# Patient Record
Sex: FEMALE | Race: WHITE | NOT HISPANIC OR LATINO | Employment: FULL TIME | URBAN - NONMETROPOLITAN AREA
[De-identification: names, ages, dates, MRNs, and addresses within clinical notes are randomized per-mention and may not be internally consistent; named-entity substitution may affect disease eponyms.]

---

## 2021-10-26 ENCOUNTER — OFFICE VISIT (OUTPATIENT)
Dept: CARDIOLOGY | Facility: CLINIC | Age: 46
End: 2021-10-26

## 2021-10-26 VITALS
HEART RATE: 58 BPM | TEMPERATURE: 97.5 F | DIASTOLIC BLOOD PRESSURE: 70 MMHG | WEIGHT: 220.8 LBS | HEIGHT: 70 IN | SYSTOLIC BLOOD PRESSURE: 118 MMHG | BODY MASS INDEX: 31.61 KG/M2

## 2021-10-26 DIAGNOSIS — R00.1 BRADYCARDIA, SINUS: ICD-10-CM

## 2021-10-26 DIAGNOSIS — R01.1 MURMUR, CARDIAC: ICD-10-CM

## 2021-10-26 DIAGNOSIS — R00.2 PALPITATIONS: Primary | ICD-10-CM

## 2021-10-26 DIAGNOSIS — E88.81 METABOLIC SYNDROME: ICD-10-CM

## 2021-10-26 PROBLEM — E88.810 METABOLIC SYNDROME: Status: ACTIVE | Noted: 2021-10-26

## 2021-10-26 PROCEDURE — 99204 OFFICE O/P NEW MOD 45 MIN: CPT | Performed by: INTERNAL MEDICINE

## 2021-10-26 PROCEDURE — 93000 ELECTROCARDIOGRAM COMPLETE: CPT | Performed by: INTERNAL MEDICINE

## 2021-10-26 RX ORDER — DIPHENOXYLATE HYDROCHLORIDE AND ATROPINE SULFATE 2.5; .025 MG/1; MG/1
TABLET ORAL DAILY
COMMUNITY

## 2021-10-26 RX ORDER — ESCITALOPRAM OXALATE 20 MG/1
20 TABLET ORAL DAILY
COMMUNITY

## 2021-10-26 RX ORDER — CHLORAL HYDRATE 500 MG
CAPSULE ORAL 2 TIMES DAILY WITH MEALS
COMMUNITY

## 2021-10-26 RX ORDER — ACETAMINOPHEN 500 MG
500 TABLET ORAL EVERY 6 HOURS PRN
COMMUNITY

## 2021-10-26 NOTE — PROGRESS NOTES
Chief Complaint   Patient presents with   • Establish Care     Patient referred per PCP for palpitations. No prior cardiac testing.   • Palpitations     Has had for the last couple years, has noticed more frequent episodes. Has noticed weekly. At times heart feels like it flips over in chest.   • Irregular Heart Beat     At times feels like heart may skip beats, notices at rest. She is very active, had weight loss surgery a year ago. She has lost 100lbs.   • Aspirin     Patient does not take.        CARDIAC COMPLAINTS  palpitations      Subjective   Maeve Shah is a 46 y.o. female came in today for her initial cardiac evaluation.  She has history of significant obesity for which she has undergone bariatric surgery about a year ago and has lost almost 100 pounds.  She has been having symptoms of palpitation on and off for the last few years and recently she noticed that the palpitation is occurring more frequently.  It occurs mostly at rest.  She feels the heart flip-flops.  It is not associated with any chest pain or shortness of breath.  It is not associated with any dizziness or loss of consciousness.  Since his symptoms are getting little bit more frequent she is now referred for further evaluation.  Since she had the bariatric surgery she has been exercising little more.  She is able to walk on the treadmill at the gym.  The symptoms of palpitation hardly occurs during exertion.  She had some labs done found to have a normal thyroid function test.  Her vitamin levels were normal.  Her blood count was lower limit of normal but still within normal limits.  Her vitamin D was normal.  Her liver function test was normal.  She used to smoke in the past but did quit in 2017.  Prior to that she smoked about 2 packs a day.  She drinks very rare caffeine.  Her mother has diabetes.  Her father as well as her paternal grandfather both  with ALS.  At this time she is not interested in having her genes tested.    No  past surgical history on file.    Current Outpatient Medications   Medication Sig Dispense Refill   • acetaminophen (TYLENOL) 500 MG tablet Take 500 mg by mouth Every 6 (Six) Hours As Needed for Mild Pain .     • CALCIUM PO Take 500 mg by mouth Daily.     • Cyanocobalamin (VITAMIN B 12 PO) Take  by mouth Daily.     • escitalopram (LEXAPRO) 20 MG tablet Take 20 mg by mouth Daily.     • multivitamin (MULTI VITAMIN PO) Take  by mouth Daily.     • Omega-3 Fatty Acids (fish oil) 1000 MG capsule capsule Take  by mouth 2 (Two) Times a Day With Meals.     • VITAMIN D PO Take  by mouth Daily.       No current facility-administered medications for this visit.           ALLERGIES:  Amoxicillin    Past Medical History:   Diagnosis Date   • Depression with anxiety    • REBEKAH (generalized anxiety disorder)    • Heart murmur     childhood   • Hx of bariatric surgery    • Hx of foot surgery    • Hx of tonsillectomy    • Hyperlipidemia    • Vitamin B deficiency    • Vitamin D deficiency        Social History     Tobacco Use   Smoking Status Former Smoker   • Packs/day: 2.00   • Years: 18.00   • Pack years: 36.00   • Quit date: 10/26/2017   • Years since quittin.0   Smokeless Tobacco Never Used          Family History   Problem Relation Age of Onset   • Diabetes Mother    • ALS Father    • Breast cancer Paternal Grandmother    • No Known Problems Sister    • Stomach cancer Maternal Grandmother    • Heart disease Maternal Grandmother    • Heart attack Maternal Grandmother 70        CABG x3   • Heart failure Maternal Grandfather    • ALS Paternal Grandfather    • No Known Problems Son    • Autism Son    • No Known Problems Daughter        Review of Systems   Constitutional: Negative for decreased appetite and malaise/fatigue.   HENT: Negative for congestion and sore throat.    Eyes: Negative for blurred vision.   Cardiovascular: Positive for palpitations. Negative for chest pain.   Respiratory: Negative for shortness of breath and  "snoring.    Endocrine: Negative for cold intolerance and heat intolerance.   Hematologic/Lymphatic: Negative for adenopathy. Does not bruise/bleed easily.   Skin: Negative for itching, nail changes and skin cancer.   Musculoskeletal: Negative for arthritis and myalgias.   Gastrointestinal: Negative for abdominal pain, dysphagia and heartburn.   Genitourinary: Negative for bladder incontinence and frequency.   Neurological: Negative for dizziness, light-headedness, seizures and vertigo.   Psychiatric/Behavioral: Negative for altered mental status.   Allergic/Immunologic: Negative for environmental allergies and hives.       Diabetes- No  Thyroid- normal    Objective     /70 (BP Location: Left arm)   Pulse 58   Temp 97.5 °F (36.4 °C)   Ht 177.8 cm (70\")   Wt 100 kg (220 lb 12.8 oz)   BMI 31.68 kg/m²     Vitals and nursing note reviewed.   Constitutional:       Appearance: Healthy appearance. Not in distress.   Eyes:      Conjunctiva/sclera: Conjunctivae normal.      Pupils: Pupils are equal, round, and reactive to light.   HENT:      Head: Normocephalic.   Pulmonary:      Effort: Pulmonary effort is normal.      Breath sounds: Normal breath sounds.   Cardiovascular:      PMI at left midclavicular line. Bradycardia present. Regular rhythm.      Murmurs: There is a systolic murmur.   Abdominal:      General: Bowel sounds are normal.      Palpations: Abdomen is soft.   Musculoskeletal: Normal range of motion.      Cervical back: Normal range of motion and neck supple. Skin:     General: Skin is warm and dry.   Neurological:      Mental Status: Alert and oriented to person, place, and time.           ECG 12 Lead    Date/Time: 10/26/2021 1:23 PM  Performed by: Jace Scales MD  Authorized by: Jace Scales MD   Previous ECG: no previous ECG available  Rhythm: sinus bradycardia  Rate: bradycardic  QRS axis: normal    Clinical impression: non-specific ECG              Assessment/Plan   Patient's Body " mass index is 31.68 kg/m². indicating that she is obese (BMI >30). Obesity-related health conditions include the following: none. Obesity is newly identified. BMI is is above average; BMI management plan is completed. We discussed low calorie, low carb based diet program, portion control and increasing exercise..     Diagnoses and all orders for this visit:    1. Palpitations (Primary)  -     Adult Transthoracic Echo Complete W/ Cont if Necessary Per Protocol; Future  -     Holter Monitor - 72 Hour Up To 15 Days; Future    2. Metabolic syndrome    3. Bradycardia, sinus  -     Holter Monitor - 72 Hour Up To 15 Days; Future    4. Murmur, cardiac  -     Adult Transthoracic Echo Complete W/ Cont if Necessary Per Protocol; Future    At baseline she is bradycardic.  Her blood pressure is normal.  Her EKG reveals sinus bradycardia, normal MN interval, no delta waves, normal QTC.  Her clinical examination reveals a BMI of 32.  She does have a short systolic murmur at the pulmonic area.    Regarding the palpitation, it appears to be more at rest rather than during the stress.  It could be octavio dependent arrhythmia.  I did explain to her about possibility of this being a PAC or PVC.  I like to place Holter monitor to evaluate for arrhythmia and also to see the frequency.  I did explain to her that if it is less than 1% arrhythmia and if she is asymptomatic otherwise then I prefer her to monitor it instead of going on any medication.  I did explain to her there is more side effects from the medications also.  If it is more than 10% or if there is any changes in the LV by echocardiogram then she might benefit with class Ic antiarrhythmic medication.    Regarding the metabolic syndrome, she has lost about 100 pounds after the surgery.  I encourage her to continue on the strict diet.  Also gave up papers on Mediterranean diet.    Regarding the bradycardia, it appears to be only mild.  If the Holter monitor shows significant  slowing of the heart rate at night then she may need to undergo nocturnal pulse PO2 measurement to rule out sleep apnea also.    Regarding the cardiac murmur, it appears to be more of a mitral regurgitation murmur.  We will get an echocardiogram to evaluate the LV function, LVH, valvular structures as well as the PA pressure    Based on the results, further recommendations will be made.               Electronically signed by Jace Scales MD October 26, 2021 13:11 EDT

## 2021-10-26 NOTE — PATIENT INSTRUCTIONS
Mediterranean Diet  A Mediterranean diet refers to food and lifestyle choices that are based on the traditions of countries located on the [place]. This way of eating has been shown to help prevent certain conditions and improve outcomes for people who have chronic diseases, like kidney disease and heart disease.  What are tips for following this plan?  Lifestyle  · Cook and eat meals together with your family, when possible.  · Drink enough fluid to keep your urine clear or pale yellow.  · Be physically active every day. This includes:  ? Aerobic exercise like running or swimming.  ? Leisure activities like gardening, walking, or housework.  · Get 7-8 hours of sleep each night.  · If recommended by your health care provider, drink red wine in moderation. This means 1 glass a day for nonpregnant women and 2 glasses a day for men. A glass of wine equals 5 oz (150 mL).  Reading food labels    · Check the serving size of packaged foods. For foods such as rice and pasta, the serving size refers to the amount of cooked product, not dry.  · Check the total fat in packaged foods. Avoid foods that have saturated fat or trans fats.  · Check the ingredients list for added sugars, such as corn syrup.    Shopping  · At the grocery store, buy most of your food from the areas near the walls of the store. This includes:  ? Fresh fruits and vegetables (produce).  ? Grains, beans, nuts, and seeds. Some of these may be available in unpackaged forms or large amounts (in bulk).  ? Fresh seafood.  ? Poultry and eggs.  ? Low-fat dairy products.  · Buy whole ingredients instead of prepackaged foods.  · Buy fresh fruits and vegetables in-season from local farmers markets.  · Buy frozen fruits and vegetables in resealable bags.  · If you do not have access to quality fresh seafood, buy precooked frozen shrimp or canned fish, such as tuna, salmon, or sardines.  · Buy small amounts of raw or cooked vegetables, salads, or olives from  the deli or salad bar at your store.  · Stock your pantry so you always have certain foods on hand, such as olive oil, canned tuna, canned tomatoes, rice, pasta, and beans.  Cooking  · Cook foods with extra-virgin olive oil instead of using butter or other vegetable oils.  · Have meat as a side dish, and have vegetables or grains as your main dish. This means having meat in small portions or adding small amounts of meat to foods like pasta or stew.  · Use beans or vegetables instead of meat in common dishes like chili or lasagna.  · [place] with different cooking methods. Try roasting or broiling vegetables instead of steaming or sautéeing them.  · Add frozen vegetables to soups, stews, pasta, or rice.  · Add nuts or seeds for added healthy fat at each meal. You can add these to yogurt, salads, or vegetable dishes.  · Marinate fish or vegetables using olive oil, lemon juice, garlic, and fresh herbs.  Meal planning    · Plan to eat 1 vegetarian meal one day each week. Try to work up to 2 vegetarian meals, if possible.  · Eat seafood 2 or more times a week.  · Have healthy snacks readily available, such as:  ? Vegetable sticks with hummus.  ? Greek yogurt.  ? Fruit and nut trail mix.  · Eat balanced meals throughout the week. This includes:  ? Fruit: 2-3 servings a day  ? Vegetables: 4-5 servings a day  ? Low-fat dairy: 2 servings a day  ? Fish, poultry, or lean meat: 1 serving a day  ? Beans and legumes: 2 or more servings a week  ? Nuts and seeds: 1-2 servings a day  ? Whole grains: 6-8 servings a day  ? Extra-virgin olive oil: 3-4 servings a day  · Limit red meat and sweets to only a few servings a month    What are my food choices?  · Mediterranean diet  ? Recommended  § Grains: Whole-grain pasta. Brown rice. Bulgar wheat. Polenta. Couscous. Whole-wheat bread. Oatmeal. Quinoa.  § Vegetables: Artichokes. Beets. Broccoli. Cabbage. Carrots. Eggplant. Green beans. Chard. Kale. Spinach. Onions. Leeks. Peas. Squash.  Tomatoes. Peppers. Radishes.  § Fruits: Apples. Apricots. Avocado. Berries. Bananas. Cherries. Dates. Figs. Grapes. [place]. Melon. Oranges. Peaches. Plums. Pomegranate.  § Meats and other protein foods: Beans. Almonds. Sunflower seeds. Pine nuts. Peanuts. Cod. [place]. Scallops. Shrimp. Tuna. Tilapia. Clams. Oysters. Eggs.  § Dairy: Low-fat milk. Cheese. Greek yogurt.  § Beverages: Water. Red wine. Herbal tea.  § Fats and oils: Extra virgin olive oil. Avocado oil. Grape seed oil.  § Sweets and desserts: Greek yogurt with honey. Baked apples. Poached pears. Trail mix.  § Seasoning and other foods: Basil. Cilantro. Coriander. Cumin. Mint. Parsley. Darin. Rosemary. Tarragon. Garlic. Oregano. Thyme. Pepper. Balsalmic vinegar. Tahini. Hummus. Tomato sauce. Olives. Mushrooms.  ? Limit these  § Grains: Prepackaged pasta or rice dishes. Prepackaged cereal with added sugar.  § Vegetables: Deep fried potatoes (french fries).  § Fruits: Fruit canned in syrup.  § Meats and other protein foods: Beef. Pork. Lamb. Poultry with skin. Hot dogs. [place]on.  § Dairy: Ice cream. Sour cream. Whole milk.  § Beverages: Juice. Sugar-sweetened soft drinks. Beer. Liquor and spirits.  § Fats and oils: Butter. Canola oil. Vegetable oil. Beef fat (tallow). Lard.  § Sweets and desserts: Cookies. Cakes. Pies. Candy.  § Seasoning and other foods: Mayonnaise. Premade sauces and marinades.  The items listed may not be a complete list. Talk with your dietitian about what dietary choices are right for you.  Summary  · The Mediterranean diet includes both food and lifestyle choices.  · Eat a variety of fresh fruits and vegetables, beans, nuts, seeds, and whole grains.  · Limit the amount of red meat and sweets that you eat.  · Talk with your health care provider about whether it is safe for you to drink red wine in moderation. This means 1 glass a day for nonpregnant women and 2 glasses a day for men. A glass of wine equals 5 oz (150 mL).  This information  is not intended to replace advice given to you by your health care provider. Make sure you discuss any questions you have with your health care provider.  Document Revised: 08/17/2017 Document Reviewed: 08/10/2017  Elsevier Patient Education © 2020 [employer]

## 2021-11-19 RX ORDER — BISOPROLOL FUMARATE 5 MG/1
TABLET, FILM COATED ORAL
Qty: 45 TABLET | Refills: 3 | Status: SHIPPED | OUTPATIENT
Start: 2021-11-19 | End: 2022-08-25

## 2021-11-19 NOTE — TELEPHONE ENCOUNTER
Pt aware of results and recommendations to try Bisoprolol 2.5 mg daily, to monitor vitals and call if there is any problems.

## 2021-11-19 NOTE — TELEPHONE ENCOUNTER
----- Message from Jace Scales MD sent at 11/18/2021  6:30 AM EST -----  Can try Bisoprolol 2.5 mg/ day

## 2021-11-30 ENCOUNTER — HOSPITAL ENCOUNTER (OUTPATIENT)
Dept: CARDIOLOGY | Facility: HOSPITAL | Age: 46
Discharge: HOME | End: 2021-11-30
Admitting: INTERNAL MEDICINE

## 2021-11-30 VITALS — HEIGHT: 70 IN | WEIGHT: 220.46 LBS | BODY MASS INDEX: 31.56 KG/M2

## 2021-11-30 DIAGNOSIS — R01.1 MURMUR, CARDIAC: ICD-10-CM

## 2021-11-30 DIAGNOSIS — R00.2 PALPITATIONS: ICD-10-CM

## 2021-11-30 LAB
AORTIC DIMENSIONLESS INDEX: 0.6 (DI)
BH CV ECHO MEAS - ACS: 1.7 CM
BH CV ECHO MEAS - AO MAX PG (FULL): 5.6 MMHG
BH CV ECHO MEAS - AO MAX PG: 8.5 MMHG
BH CV ECHO MEAS - AO MEAN PG (FULL): 4 MMHG
BH CV ECHO MEAS - AO MEAN PG: 5 MMHG
BH CV ECHO MEAS - AO ROOT AREA (BSA CORRECTED): 1.3
BH CV ECHO MEAS - AO ROOT AREA: 6.2 CM^2
BH CV ECHO MEAS - AO ROOT DIAM: 2.8 CM
BH CV ECHO MEAS - AO V2 MAX: 146 CM/SEC
BH CV ECHO MEAS - AO V2 MEAN: 104 CM/SEC
BH CV ECHO MEAS - AO V2 VTI: 37.1 CM
BH CV ECHO MEAS - AVA(I,A): 1.8 CM^2
BH CV ECHO MEAS - AVA(I,D): 1.8 CM^2
BH CV ECHO MEAS - AVA(V,A): 1.8 CM^2
BH CV ECHO MEAS - AVA(V,D): 1.8 CM^2
BH CV ECHO MEAS - BSA(HAYCOCK): 2.2 M^2
BH CV ECHO MEAS - BSA: 2.2 M^2
BH CV ECHO MEAS - BZI_BMI: 31.6 KILOGRAMS/M^2
BH CV ECHO MEAS - BZI_METRIC_HEIGHT: 177.8 CM
BH CV ECHO MEAS - BZI_METRIC_WEIGHT: 99.8 KG
BH CV ECHO MEAS - EDV(CUBED): 148 ML
BH CV ECHO MEAS - EDV(TEICH): 134.8 ML
BH CV ECHO MEAS - EF(CUBED): 66.6 %
BH CV ECHO MEAS - EF(MOD-BP): 57 %
BH CV ECHO MEAS - EF(TEICH): 57.7 %
BH CV ECHO MEAS - EF_3D-VOL: 64 %
BH CV ECHO MEAS - ESV(CUBED): 49.4 ML
BH CV ECHO MEAS - ESV(TEICH): 57 ML
BH CV ECHO MEAS - FS: 30.6 %
BH CV ECHO MEAS - IVS/LVPW: 0.86
BH CV ECHO MEAS - IVSD: 0.81 CM
BH CV ECHO MEAS - LA DIMENSION: 3.9 CM
BH CV ECHO MEAS - LA/AO: 1.4
BH CV ECHO MEAS - LAT PEAK E' VEL: 15.1 CM/SEC
BH CV ECHO MEAS - LV IVRT: 0.12 SEC
BH CV ECHO MEAS - LV MASS(C)D: 166.2 GRAMS
BH CV ECHO MEAS - LV MASS(C)DI: 76.5 GRAMS/M^2
BH CV ECHO MEAS - LV MAX PG: 2.9 MMHG
BH CV ECHO MEAS - LV MEAN PG: 1 MMHG
BH CV ECHO MEAS - LV V1 MAX: 84.9 CM/SEC
BH CV ECHO MEAS - LV V1 MEAN: 52.6 CM/SEC
BH CV ECHO MEAS - LV V1 VTI: 21.2 CM
BH CV ECHO MEAS - LVIDD: 5.3 CM
BH CV ECHO MEAS - LVIDS: 3.7 CM
BH CV ECHO MEAS - LVOT AREA (M): 3.1 CM^2
BH CV ECHO MEAS - LVOT AREA: 3.1 CM^2
BH CV ECHO MEAS - LVOT DIAM: 2 CM
BH CV ECHO MEAS - LVPWD: 0.93 CM
BH CV ECHO MEAS - MED PEAK E' VEL: 12.9 CM/SEC
BH CV ECHO MEAS - MV A MAX VEL: 36.9 CM/SEC
BH CV ECHO MEAS - MV DEC SLOPE: 398 CM/SEC^2
BH CV ECHO MEAS - MV DEC TIME: 0.25 SEC
BH CV ECHO MEAS - MV E MAX VEL: 116 CM/SEC
BH CV ECHO MEAS - MV E/A: 3.1
BH CV ECHO MEAS - MV MAX PG: 5 MMHG
BH CV ECHO MEAS - MV MEAN PG: 1 MMHG
BH CV ECHO MEAS - MV P1/2T MAX VEL: 124 CM/SEC
BH CV ECHO MEAS - MV P1/2T: 91.3 MSEC
BH CV ECHO MEAS - MV V2 MAX: 112 CM/SEC
BH CV ECHO MEAS - MV V2 MEAN: 53.3 CM/SEC
BH CV ECHO MEAS - MV V2 VTI: 37.3 CM
BH CV ECHO MEAS - MVA P1/2T LCG: 1.8 CM^2
BH CV ECHO MEAS - MVA(P1/2T): 2.4 CM^2
BH CV ECHO MEAS - MVA(VTI): 1.8 CM^2
BH CV ECHO MEAS - PA MAX PG (FULL): 2.8 MMHG
BH CV ECHO MEAS - PA MAX PG: 4.8 MMHG
BH CV ECHO MEAS - PA MEAN PG (FULL): 2 MMHG
BH CV ECHO MEAS - PA MEAN PG: 3 MMHG
BH CV ECHO MEAS - PA V2 MAX: 109 CM/SEC
BH CV ECHO MEAS - PA V2 MEAN: 76.2 CM/SEC
BH CV ECHO MEAS - PA V2 VTI: 27.8 CM
BH CV ECHO MEAS - RAP SYSTOLE: 10 MMHG
BH CV ECHO MEAS - RV MAX PG: 1.9 MMHG
BH CV ECHO MEAS - RV MEAN PG: 1 MMHG
BH CV ECHO MEAS - RV V1 MAX: 69.8 CM/SEC
BH CV ECHO MEAS - RV V1 MEAN: 45.9 CM/SEC
BH CV ECHO MEAS - RV V1 VTI: 16.8 CM
BH CV ECHO MEAS - RVDD: 2.9 CM
BH CV ECHO MEAS - RVSP: 24 MMHG
BH CV ECHO MEAS - SI(AO): 105.1 ML/M^2
BH CV ECHO MEAS - SI(CUBED): 45.4 ML/M^2
BH CV ECHO MEAS - SI(LVOT): 30.6 ML/M^2
BH CV ECHO MEAS - SI(TEICH): 35.8 ML/M^2
BH CV ECHO MEAS - SV(AO): 228.4 ML
BH CV ECHO MEAS - SV(CUBED): 98.6 ML
BH CV ECHO MEAS - SV(LVOT): 66.6 ML
BH CV ECHO MEAS - SV(TEICH): 77.7 ML
BH CV ECHO MEAS - TR MAX VEL: 187 CM/SEC
BH CV ECHO MEASUREMENTS AVERAGE E/E' RATIO: 8.29
MAXIMAL PREDICTED HEART RATE: 176 BPM
SINUS: 2.5 CM
STRESS TARGET HR: 150 BPM

## 2021-11-30 PROCEDURE — 93306 TTE W/DOPPLER COMPLETE: CPT | Performed by: INTERNAL MEDICINE

## 2021-11-30 PROCEDURE — 93306 TTE W/DOPPLER COMPLETE: CPT

## 2022-04-27 ENCOUNTER — OFFICE VISIT (OUTPATIENT)
Dept: CARDIOLOGY | Facility: CLINIC | Age: 47
End: 2022-04-27

## 2022-04-27 VITALS
BODY MASS INDEX: 34.88 KG/M2 | SYSTOLIC BLOOD PRESSURE: 140 MMHG | WEIGHT: 243.6 LBS | HEIGHT: 70 IN | HEART RATE: 68 BPM | DIASTOLIC BLOOD PRESSURE: 70 MMHG

## 2022-04-27 DIAGNOSIS — I34.0 MITRAL VALVE INSUFFICIENCY, UNSPECIFIED ETIOLOGY: ICD-10-CM

## 2022-04-27 DIAGNOSIS — R01.1 MURMUR, CARDIAC: ICD-10-CM

## 2022-04-27 DIAGNOSIS — I35.0 AORTIC VALVE STENOSIS, ETIOLOGY OF CARDIAC VALVE DISEASE UNSPECIFIED: ICD-10-CM

## 2022-04-27 DIAGNOSIS — I10 ESSENTIAL HYPERTENSION: ICD-10-CM

## 2022-04-27 DIAGNOSIS — E66.9 OBESITY (BMI 30.0-34.9): ICD-10-CM

## 2022-04-27 DIAGNOSIS — R00.2 PALPITATIONS: Primary | ICD-10-CM

## 2022-04-27 PROCEDURE — 99214 OFFICE O/P EST MOD 30 MIN: CPT | Performed by: NURSE PRACTITIONER

## 2022-04-27 NOTE — PROGRESS NOTES
Chief Complaint   Patient presents with   • Follow-up     Cardiac management.  Has no cardiac complaints today.    • LABS     Current labs on chart   • Palpitations     Reports palpitations have improved greatly. If she misses a dose of medication the palpitations return   • Med Refill     No refills needed today. Had med list today.       Subjective       Maeve Shah is a 46 y.o. female seen in October 2021 for initial cardiac evaluation of palpitations.  She has history of bariatric surgery and lost around 100 pounds.  Cardiac work-up was done including echocardiogram that showed normal LVEF and possible bicuspid aortic valve.  10-day cardiac monitor showed 11 short episodes of SVT.  Beta-blocker was initiated.    Today she returns to the office for follow-up visit.  The patient states that she was having heart palpitations and had an echocardiogram back in 10/2021. She states that she wore a cardiac monitor for approximately 10 days. She reports that she is feeling better. She adds that she had no clue to how many heart palpitations that she had until she took the medicine and they stopped. She communicates that it is unreal because she has felt them since she was a kid.   She states that she missed the medicine for 2 days when they went to [place]. She states that she felt like she was not going to die, but when they got home that Sunday night, she could feel her heart beating hard and she felt the heart palpitations were back. She adds that she did not realize how it was to live without that feeling until she took the medication.   The patient states that her blood pressure is elevated today due to her autistic son having a bad episode at school.       Past Surgical History:   Procedure Laterality Date   • CONVERTED (HISTORICAL) HOLTER  11/17/2021    10 days. Avg 79. . 11 runs of SVT   • ECHO - CONVERTED  11/30/2021    EF 60%. LA- 3.9 Cm. R/O Bicuspid AV. YOGI- 1.8 Cm2. Mild MR. RVSP- 24 mmHg        Current Outpatient Medications   Medication Sig Dispense Refill   • acetaminophen (TYLENOL) 500 MG tablet Take 500 mg by mouth Every 6 (Six) Hours As Needed for Mild Pain .     • bisoprolol (ZEBeta) 5 MG tablet Take 1/2 tab daily 45 tablet 3   • CALCIUM PO Take 500 mg by mouth Daily.     • Cyanocobalamin (VITAMIN B 12 PO) Take  by mouth Daily.     • escitalopram (LEXAPRO) 20 MG tablet Take 20 mg by mouth Daily.     • multivitamin (THERAGRAN) tablet tablet Take  by mouth Daily.     • Omega-3 Fatty Acids (fish oil) 1000 MG capsule capsule Take  by mouth 2 (Two) Times a Day With Meals.     • VITAMIN D PO Take  by mouth Daily.       No current facility-administered medications for this visit.       Amoxicillin    Past Medical History:   Diagnosis Date   • Depression with anxiety    • REBEKAH (generalized anxiety disorder)    • Heart murmur     childhood   • Hx of bariatric surgery    • Hx of foot surgery    • Hx of tonsillectomy    • Hyperlipidemia    • Vitamin B deficiency    • Vitamin D deficiency        Social History     Socioeconomic History   • Marital status:    Tobacco Use   • Smoking status: Former Smoker     Packs/day: 2.00     Years: 18.00     Pack years: 36.00     Quit date: 10/26/2017     Years since quittin.5   • Smokeless tobacco: Never Used   Vaping Use   • Vaping Use: Never used   Substance and Sexual Activity   • Alcohol use: Never   • Drug use: Never       Family History   Problem Relation Age of Onset   • Diabetes Mother    • ALS Father    • Breast cancer Paternal Grandmother    • No Known Problems Sister    • Stomach cancer Maternal Grandmother    • Heart disease Maternal Grandmother    • Heart attack Maternal Grandmother 70        CABG x3   • Heart failure Maternal Grandfather    • ALS Paternal Grandfather    • No Known Problems Son    • Autism Son    • No Known Problems Daughter        Review of Systems   Constitutional: Negative for decreased appetite, diaphoresis and  "malaise/fatigue.   HENT: Negative for nosebleeds.    Eyes: Negative for blurred vision.   Cardiovascular: Positive for palpitations (stopped after beginning medication). Negative for chest pain, claudication, cyanosis, dyspnea on exertion, irregular heartbeat, leg swelling, near-syncope, orthopnea, paroxysmal nocturnal dyspnea and syncope.   Respiratory: Negative for shortness of breath and snoring.    Endocrine: Negative for cold intolerance and heat intolerance.   Hematologic/Lymphatic: Does not bruise/bleed easily.   Skin: Negative for rash.   Musculoskeletal: Negative for myalgias.   Gastrointestinal: Negative for heartburn, melena and nausea.   Genitourinary: Negative for dysuria and hematuria.   Neurological: Negative for dizziness and light-headedness.   Psychiatric/Behavioral: The patient is nervous/anxious. The patient does not have insomnia.         BP Readings from Last 5 Encounters:   04/27/22 140/70   10/26/21 118/70       Wt Readings from Last 5 Encounters:   04/27/22 110 kg (243 lb 9.6 oz)   11/30/21 100 kg (220 lb 7.4 oz)   10/26/21 100 kg (220 lb 12.8 oz)       Objective     /70 (BP Location: Left arm)   Pulse 68   Ht 177.8 cm (70\")   Wt 110 kg (243 lb 9.6 oz)   BMI 34.95 kg/m²     Vitals and nursing note reviewed.   Eyes:      Pupils: Pupils are equal, round, and reactive to light.   HENT:      Head: Normocephalic.   Neck:      Thyroid: No thyromegaly.      Vascular: No carotid bruit.   Pulmonary:      Breath sounds: Normal breath sounds.   Cardiovascular:      Normal rate. Regular rhythm.      Murmurs: There is a grade 2/6 systolic murmur. short systolic murmur at the mitral area noted.   Pulses:     Intact distal pulses.   Edema:     Peripheral edema absent.   Abdominal:      General: Bowel sounds are normal.      Palpations: Abdomen is soft.   Musculoskeletal: Normal range of motion.      Cervical back: Normal range of motion. Skin:     General: Skin is warm.   Neurological:      " Mental Status: Alert and oriented to person, place, and time.          Procedures: none today          Assessment/Plan   Diagnoses and all orders for this visit:    1. Palpitations (Primary)    2. Essential hypertension    3. Murmur, cardiac    4. Mitral valve insufficiency, unspecified etiology    5. Aortic valve stenosis, etiology of cardiac valve disease unspecified    6. Obesity (BMI 30.0-34.9)            1. Palpitations  - Discussed that the patient's heart function was normal with good cardiac output from echocardiogram.  - Discussed that the pressure between the heart and the lungs was good.   - Provided handouts on heart palpitations.  - Discussed staying hydrated, managing stress, anxiety, and avoiding caffeine all help to control the symptoms with the heart.  - Patient is exercising and has lost some weight.  - Provided handouts on lifestyle behaviors.  - The patient does not need refills today.  - Discussed that an annual visit is sufficient at this point and to call sooner for any cardiac concerns.    2. Hypertension  - No mediation changes advised.   - Continue to monitor and dash diet.  - DASH diet recommended with literature given.  Limit sodium to 1,500 mg daily.    3. Murmur  - Discussed echocardiogram.  Heart size is normal and there was no significant valve issues but possible bicuspid aortic valve noted with mild aortic stenosis and mild mitral regurgitation.    6.  Obesity  -Continue diet and exercise efforts.     Transcribed from ambient dictation for KARLOS Marquez by Karyna Rhodes  04/27/22   16:51 EDT    Patient verbalized consent to the visit recording.

## 2022-04-28 PROBLEM — I35.0 AORTIC VALVE STENOSIS: Status: ACTIVE | Noted: 2022-04-28

## 2022-04-28 PROBLEM — I34.0 MITRAL VALVE INSUFFICIENCY: Status: ACTIVE | Noted: 2022-04-28

## 2022-08-25 RX ORDER — BISOPROLOL FUMARATE 5 MG/1
TABLET, FILM COATED ORAL
Qty: 45 TABLET | Refills: 3 | Status: SHIPPED | OUTPATIENT
Start: 2022-08-25

## 2023-01-31 ENCOUNTER — TELEPHONE (OUTPATIENT)
Dept: CARDIOLOGY | Facility: CLINIC | Age: 48
End: 2023-01-31
Payer: COMMERCIAL

## 2023-01-31 NOTE — TELEPHONE ENCOUNTER
Received fax from [facility]andes for cardiac clearance for patient to start on a non-stimulant medication for ADHD, Strattera, which can impose risks for QT prolongation, tachycardia, and hypertension. They are asking if it is ok for patient to start this medication?        Fax [phone]

## 2024-09-16 ENCOUNTER — APPOINTMENT (OUTPATIENT)
Dept: WOMENS IMAGING | Facility: HOSPITAL | Age: 49
End: 2024-09-16
Payer: COMMERCIAL

## 2024-09-16 PROCEDURE — G0279 TOMOSYNTHESIS, MAMMO: HCPCS | Performed by: RADIOLOGY

## 2024-09-16 PROCEDURE — 77061 BREAST TOMOSYNTHESIS UNI: CPT | Performed by: RADIOLOGY

## 2024-09-16 PROCEDURE — 77065 DX MAMMO INCL CAD UNI: CPT | Performed by: RADIOLOGY
